# Patient Record
Sex: FEMALE | Race: ASIAN | NOT HISPANIC OR LATINO | URBAN - METROPOLITAN AREA
[De-identification: names, ages, dates, MRNs, and addresses within clinical notes are randomized per-mention and may not be internally consistent; named-entity substitution may affect disease eponyms.]

---

## 2020-04-02 ENCOUNTER — EMERGENCY (EMERGENCY)
Facility: HOSPITAL | Age: 70
LOS: 1 days | Discharge: HOME | End: 2020-04-02
Attending: EMERGENCY MEDICINE
Payer: MEDICARE

## 2020-04-02 VITALS
SYSTOLIC BLOOD PRESSURE: 154 MMHG | HEIGHT: 63 IN | TEMPERATURE: 99 F | DIASTOLIC BLOOD PRESSURE: 80 MMHG | WEIGHT: 147.05 LBS | RESPIRATION RATE: 20 BRPM | OXYGEN SATURATION: 95 % | HEART RATE: 103 BPM

## 2020-04-02 VITALS
HEART RATE: 92 BPM | OXYGEN SATURATION: 97 % | SYSTOLIC BLOOD PRESSURE: 123 MMHG | RESPIRATION RATE: 18 BRPM | TEMPERATURE: 103 F | DIASTOLIC BLOOD PRESSURE: 70 MMHG

## 2020-04-02 PROCEDURE — 99282 EMERGENCY DEPT VISIT SF MDM: CPT

## 2020-04-02 PROCEDURE — 99283 EMERGENCY DEPT VISIT LOW MDM: CPT

## 2020-04-02 RX ORDER — ACETAMINOPHEN 500 MG
650 TABLET ORAL ONCE
Refills: 0 | Status: COMPLETED | OUTPATIENT
Start: 2020-04-02 | End: 2020-04-02

## 2020-04-02 RX ADMIN — Medication 650 MILLIGRAM(S): at 22:17

## 2020-04-02 NOTE — ED PROVIDER NOTE - OBJECTIVE STATEMENT
*** PATIENT SEEN DURING COVID PANDEMIC -- [place] IN STATE OF [facility] -- HOSPITAL RESOURCES ARE CRITICALLY OVERWHELMED -- NORMAL DECISION MAKING PROCESS IS SIGNIFICANTLY ALTERED -- RISK/BENEFIT ANALYSIS FAVORS DISCHARGE/OUTPATIENT MANAGEMENT IN MANY CASES THAT WOULD NORMALLY BE ADMITTED ***     70 yo female denies PMH p/w cough, weakness x 9d, worse over the last 2d, decreased appetite 2/2 loss of taste.  denies SOB, no syncope, no CP.

## 2020-04-02 NOTE — ED ADULT NURSE NOTE - OBJECTIVE STATEMENT
69y Female A&OX3 came to ED c/o weakness. No PMH or PSH. Pt states since last Friday, she has been feeling sick with dry cough, headache, fever. Went to ED and was told Chest X-ray was clear, no COVID test done, Pt was discharged.  Pt states she started to continue to have weakness, fever controlled with Tylenol, and taste and smell started to diminish, Pt started to have decreased appetite.  Pt was worried and and came to ED. Pt presents with generalized weakness, walking SPO2 at 96% on room air.  Denies sob, chest pain, ha, n/v/d, abdominal pain, urinary symptoms, hematuria. Upon examination, full facial symmetry, no JVD or trach deviation, lung sounds clear and adequate chest rise, S1 and S2 heart sounds present, +2 pulses in all extremities with full ROM and equal strength, cap refill <2 seconds, ABD soft, non distended and non tender, pelvis intact

## 2020-04-02 NOTE — ED PROVIDER NOTE - CLINICAL SUMMARY MEDICAL DECISION MAKING FREE TEXT BOX
ambulatory O2 sat 95% on room air with no respiratory distress.  patient is likely to be more harmed by admission at this time than benefit, particularly given hospital resources are currently overwhelmed by COVID pandemic.  return for worsening SOB.

## 2020-04-02 NOTE — ED PROVIDER NOTE - PATIENT PORTAL LINK FT
You can access the FollowMyHealth Patient Portal offered by [facility] by registering at the following website: [email or web address]. By joining [employer] [facility]’s FollowMyHealth portal, you will also be able to view your health information using other applications (apps) compatible with our system.

## 2020-04-02 NOTE — ED PROVIDER NOTE - PHYSICAL EXAMINATION
GEN: calm, cooperative  EYES: EOM intact  ENT: mucous membranes moist  HEAD: NCAT  CV: S1 S2   RESP: no respiratory distress  ABD: no abdominal distension  MSK: moves all extremities  NEURO: awake, alert, oriented  PSYCH: affect normal  SKIN: warm

## 2020-04-02 NOTE — ED ADULT NURSE NOTE - NSIMPLEMENTINTERV_GEN_ALL_ED
Implemented All Universal Safety Interventions:  [place] to call system. Call bell, personal items and telephone within reach. Instruct patient to call for assistance. Room bathroom lighting operational. Non-slip footwear when patient is off stretcher. Physically safe environment: no spills, clutter or unnecessary equipment. Stretcher in lowest position, wheels locked, appropriate side rails in place.

## 2020-04-02 NOTE — ED ADULT NURSE REASSESSMENT NOTE - NS ED NURSE REASSESS COMMENT FT1
Pt had 103 F fever, Pt given 650mg Tylenol.  As per MD, Pt can be discharged after giving Tylenol.  Pt informed about COVID 19,  Pt understands quarantine precautions, Pt discharged with steady gait to waiting room.

## 2020-04-02 NOTE — ED PROVIDER NOTE - NSFOLLOWUPINSTRUCTIONS_ED_ALL_ED_FT
YOUR SYMPTOMS ARE CONSISTENT WITH CORONAVIRUS INFECTION.    ISOLATE YOURSELF FOR 14 DAYS FROM THE START OF YOUR SYMPTOMS.  MAKE SURE YOU HAVE NO FEVER FOR AT LEAST 72 HOURS BEFORE YOU END YOUR ISOLATION.    RETURN TO THE ED FOR TROUBLE BREATHING OR AN INABILITY TO WALK.

## 2020-04-02 NOTE — ED ADULT TRIAGE NOTE - CHIEF COMPLAINT QUOTE
pt states, "I have been feeling sick since last week but I haven't been eating because nothing tastes good"

## 2020-04-03 ENCOUNTER — EMERGENCY (EMERGENCY)
Facility: HOSPITAL | Age: 70
LOS: 1 days | Discharge: HOME | End: 2020-04-03
Attending: EMERGENCY MEDICINE
Payer: MEDICARE

## 2020-04-03 VITALS
SYSTOLIC BLOOD PRESSURE: 116 MMHG | OXYGEN SATURATION: 93 % | HEART RATE: 98 BPM | DIASTOLIC BLOOD PRESSURE: 55 MMHG | RESPIRATION RATE: 20 BRPM | HEIGHT: 63 IN | WEIGHT: 147.05 LBS | TEMPERATURE: 101 F

## 2020-04-03 VITALS
RESPIRATION RATE: 16 BRPM | DIASTOLIC BLOOD PRESSURE: 75 MMHG | SYSTOLIC BLOOD PRESSURE: 130 MMHG | TEMPERATURE: 99 F | HEART RATE: 82 BPM | OXYGEN SATURATION: 95 %

## 2020-04-03 LAB
ALBUMIN SERPL ELPH-MCNC: 3.9 G/DL — SIGNIFICANT CHANGE UP (ref 3.3–5)
ALP SERPL-CCNC: 67 U/L — SIGNIFICANT CHANGE UP (ref 40–120)
ALT FLD-CCNC: 18 U/L — SIGNIFICANT CHANGE UP (ref 10–45)
ANION GAP SERPL CALC-SCNC: 16 MMOL/L — SIGNIFICANT CHANGE UP (ref 5–17)
APPEARANCE UR: ABNORMAL
APTT BLD: 32.3 SEC — SIGNIFICANT CHANGE UP (ref 27.5–36.3)
AST SERPL-CCNC: 26 U/L — SIGNIFICANT CHANGE UP (ref 10–40)
BASOPHILS # BLD AUTO: 0.01 K/UL — SIGNIFICANT CHANGE UP (ref 0–0.2)
BASOPHILS NFR BLD AUTO: 0.2 % — SIGNIFICANT CHANGE UP (ref 0–2)
BILIRUB SERPL-MCNC: 0.3 MG/DL — SIGNIFICANT CHANGE UP (ref 0.2–1.2)
BILIRUB UR-MCNC: NEGATIVE — SIGNIFICANT CHANGE UP
BUN SERPL-MCNC: 13 MG/DL — SIGNIFICANT CHANGE UP (ref 7–23)
CALCIUM SERPL-MCNC: 9.1 MG/DL — SIGNIFICANT CHANGE UP (ref 8.4–10.5)
CHLORIDE SERPL-SCNC: 96 MMOL/L — SIGNIFICANT CHANGE UP (ref 96–108)
CO2 SERPL-SCNC: 19 MMOL/L — LOW (ref 22–31)
COLOR SPEC: YELLOW — SIGNIFICANT CHANGE UP
CREAT SERPL-MCNC: 0.7 MG/DL — SIGNIFICANT CHANGE UP (ref 0.5–1.3)
DIFF PNL FLD: ABNORMAL
EOSINOPHIL # BLD AUTO: 0 K/UL — SIGNIFICANT CHANGE UP (ref 0–0.5)
EOSINOPHIL NFR BLD AUTO: 0 % — SIGNIFICANT CHANGE UP (ref 0–6)
GLUCOSE SERPL-MCNC: 107 MG/DL — HIGH (ref 70–99)
GLUCOSE UR QL: NEGATIVE — SIGNIFICANT CHANGE UP
HCT VFR BLD CALC: 39.9 % — SIGNIFICANT CHANGE UP (ref 34.5–45)
HGB BLD-MCNC: 12.9 G/DL — SIGNIFICANT CHANGE UP (ref 11.5–15.5)
IMM GRANULOCYTES NFR BLD AUTO: 0.4 % — SIGNIFICANT CHANGE UP (ref 0–1.5)
INR BLD: 1.19 RATIO — HIGH (ref 0.88–1.16)
KETONES UR-MCNC: NEGATIVE — SIGNIFICANT CHANGE UP
LEUKOCYTE ESTERASE UR-ACNC: ABNORMAL
LYMPHOCYTES # BLD AUTO: 0.87 K/UL — LOW (ref 1–3.3)
LYMPHOCYTES # BLD AUTO: 15.6 % — SIGNIFICANT CHANGE UP (ref 13–44)
MAGNESIUM SERPL-MCNC: 2.2 MG/DL — SIGNIFICANT CHANGE UP (ref 1.6–2.6)
MCHC RBC-ENTMCNC: 25.5 PG — LOW (ref 27–34)
MCHC RBC-ENTMCNC: 32.3 GM/DL — SIGNIFICANT CHANGE UP (ref 32–36)
MCV RBC AUTO: 78.9 FL — LOW (ref 80–100)
MONOCYTES # BLD AUTO: 0.3 K/UL — SIGNIFICANT CHANGE UP (ref 0–0.9)
MONOCYTES NFR BLD AUTO: 5.4 % — SIGNIFICANT CHANGE UP (ref 2–14)
NEUTROPHILS # BLD AUTO: 4.36 K/UL — SIGNIFICANT CHANGE UP (ref 1.8–7.4)
NEUTROPHILS NFR BLD AUTO: 78.4 % — HIGH (ref 43–77)
NITRITE UR-MCNC: POSITIVE
NRBC # BLD: 0 /100 WBCS — SIGNIFICANT CHANGE UP (ref 0–0)
PH UR: 6 — SIGNIFICANT CHANGE UP (ref 5–8)
PHOSPHATE SERPL-MCNC: 3.1 MG/DL — SIGNIFICANT CHANGE UP (ref 2.5–4.5)
PLATELET # BLD AUTO: 313 K/UL — SIGNIFICANT CHANGE UP (ref 150–400)
POTASSIUM SERPL-MCNC: 4.3 MMOL/L — SIGNIFICANT CHANGE UP (ref 3.5–5.3)
POTASSIUM SERPL-SCNC: 4.3 MMOL/L — SIGNIFICANT CHANGE UP (ref 3.5–5.3)
PROT SERPL-MCNC: 7.5 G/DL — SIGNIFICANT CHANGE UP (ref 6–8.3)
PROT UR-MCNC: ABNORMAL
PROTHROM AB SERPL-ACNC: 13.6 SEC — HIGH (ref 10–12.9)
RBC # BLD: 5.06 M/UL — SIGNIFICANT CHANGE UP (ref 3.8–5.2)
RBC # FLD: 13.9 % — SIGNIFICANT CHANGE UP (ref 10.3–14.5)
SODIUM SERPL-SCNC: 131 MMOL/L — LOW (ref 135–145)
SP GR SPEC: 1.02 — SIGNIFICANT CHANGE UP (ref 1.01–1.02)
UROBILINOGEN FLD QL: NEGATIVE — SIGNIFICANT CHANGE UP
WBC # BLD: 5.56 K/UL — SIGNIFICANT CHANGE UP (ref 3.8–10.5)
WBC # FLD AUTO: 5.56 K/UL — SIGNIFICANT CHANGE UP (ref 3.8–10.5)

## 2020-04-03 PROCEDURE — 99285 EMERGENCY DEPT VISIT HI MDM: CPT

## 2020-04-03 PROCEDURE — 85610 PROTHROMBIN TIME: CPT

## 2020-04-03 PROCEDURE — 85730 THROMBOPLASTIN TIME PARTIAL: CPT

## 2020-04-03 PROCEDURE — 81001 URINALYSIS AUTO W/SCOPE: CPT

## 2020-04-03 PROCEDURE — 71045 X-RAY EXAM CHEST 1 VIEW: CPT

## 2020-04-03 PROCEDURE — 84100 ASSAY OF PHOSPHORUS: CPT

## 2020-04-03 PROCEDURE — 93005 ELECTROCARDIOGRAM TRACING: CPT

## 2020-04-03 PROCEDURE — 87186 SC STD MICRODIL/AGAR DIL: CPT

## 2020-04-03 PROCEDURE — 85027 COMPLETE CBC AUTOMATED: CPT

## 2020-04-03 PROCEDURE — 99284 EMERGENCY DEPT VISIT MOD MDM: CPT | Mod: 25

## 2020-04-03 PROCEDURE — 87086 URINE CULTURE/COLONY COUNT: CPT

## 2020-04-03 PROCEDURE — 83735 ASSAY OF MAGNESIUM: CPT

## 2020-04-03 PROCEDURE — 83690 ASSAY OF LIPASE: CPT

## 2020-04-03 PROCEDURE — 96374 THER/PROPH/DIAG INJ IV PUSH: CPT

## 2020-04-03 PROCEDURE — 93010 ELECTROCARDIOGRAM REPORT: CPT

## 2020-04-03 PROCEDURE — 71045 X-RAY EXAM CHEST 1 VIEW: CPT | Mod: 26

## 2020-04-03 PROCEDURE — 84484 ASSAY OF TROPONIN QUANT: CPT

## 2020-04-03 PROCEDURE — 80053 COMPREHEN METABOLIC PANEL: CPT

## 2020-04-03 RX ORDER — ACETAMINOPHEN 500 MG
975 TABLET ORAL ONCE
Refills: 0 | Status: COMPLETED | OUTPATIENT
Start: 2020-04-03 | End: 2020-04-03

## 2020-04-03 RX ORDER — SODIUM CHLORIDE 9 MG/ML
500 INJECTION INTRAMUSCULAR; INTRAVENOUS; SUBCUTANEOUS ONCE
Refills: 0 | Status: COMPLETED | OUTPATIENT
Start: 2020-04-03 | End: 2020-04-03

## 2020-04-03 RX ORDER — CEPHALEXIN 500 MG
1 CAPSULE ORAL
Qty: 14 | Refills: 0
Start: 2020-04-03 | End: 2020-04-09

## 2020-04-03 RX ORDER — CEFTRIAXONE 500 MG/1
1000 INJECTION, POWDER, FOR SOLUTION INTRAMUSCULAR; INTRAVENOUS ONCE
Refills: 0 | Status: COMPLETED | OUTPATIENT
Start: 2020-04-03 | End: 2020-04-03

## 2020-04-03 RX ADMIN — SODIUM CHLORIDE 500 MILLILITER(S): 9 INJECTION INTRAMUSCULAR; INTRAVENOUS; SUBCUTANEOUS at 14:42

## 2020-04-03 RX ADMIN — CEFTRIAXONE 100 MILLIGRAM(S): 500 INJECTION, POWDER, FOR SOLUTION INTRAMUSCULAR; INTRAVENOUS at 14:42

## 2020-04-03 RX ADMIN — Medication 975 MILLIGRAM(S): at 12:22

## 2020-04-03 NOTE — ED PROVIDER NOTE - PHYSICAL EXAMINATION
A&Ox3, NAD. NCAT. PERRL, EOMI. Neck supple, no LAD. Lungs CTAB, breathing comfortably, speaking in sentences. +S1S2, RRR, No m/r/g. Abd soft, NT/ND, +BS, no rebound or guarding. Extremities: cap refill <2, pulses in distal extremities 4+, no edema. Skin without rash. CN II-XII intact. Strength 5/5 UE/LE. Sensations intact throughout. Gait steady. A&Ox3, NAD. NCAT. PERRL, EOMI. Neck supple, no LAD. Lungs CTAB, breathing comfortably, speaking in sentences. +S1S2, RRR, No m/r/g. Abd soft, NT/ND, +BS, no rebound or guarding. Extremities: cap refill <2, pulses in distal extremities 4+, no edema. Skin without rash. CN II-XII intact. Strength 5/5 UE/LE. Sensations intact throughout. Gait steady.  Attending Cara Palmer: Gen: NAD, heent: atrauamtic, eomi, perrla, mmm, op pink, uvula midline, neck; nttp, no nuchal rigidity, chest: nttp, no crepitus, cv: rrr, no murmurs, lungs: ctab, abd: soft, nontender, nondistended, no peritoneal signs, +BS, no guarding, ext: wwp, neg homans, skin: no rash, neuro: awake and alert, following commands, speech clear, sensation and strength intact, no focal deficits , ambulating without respiratoyr distress

## 2020-04-03 NOTE — ED PROVIDER NOTE - CARE PLAN
Principal Discharge DX:	Suspected 2019 novel coronavirus infection  Secondary Diagnosis:	Decreased appetite Principal Discharge DX:	Suspected 2019 novel coronavirus infection  Secondary Diagnosis:	Decreased appetite  Secondary Diagnosis:	Infective urethritis

## 2020-04-03 NOTE — ED PROVIDER NOTE - PROGRESS NOTE DETAILS
pts ambulatory saturations 91-92% on RA, breathing comfortably, speaking in full sentences. -EVELIO MillerC Pt tolerating PO. -Katty Thornton PA-C Attending Cara Palmer: pt well appaering. UA shows signs of infection will cover with abx pending culture. pt ambulating without hypoxia will d/c with close return precautions

## 2020-04-03 NOTE — ED PROVIDER NOTE - ATTENDING CONTRIBUTION TO CARE
Attending MD Cara Palmer:   I personally have seen and examined this patient.  Physician assistant note reviewed and agree on plan of care and except where noted.  See HPI, PE, and MDM for details.

## 2020-04-03 NOTE — ED ADULT NURSE NOTE - OBJECTIVE STATEMENT
Patient was seen here yesterday for the same.  Complaining of lack of appetite and unable to eat.  Denies nausea and vomiting.  No diarrhea.  Patient was febrile in the room and talking in full sentences. Able to ambulate with no distress

## 2020-04-03 NOTE — ED PROVIDER NOTE - OBJECTIVE STATEMENT
70 yo female with no pmh presenting with concern of weakness, fevers, chills, dry cough x 11 days, increased weakness and decreased appetite for the past 4 days here d/t concern of loss of appetite and increased weakness since previous ED visit yesterday. pt states she has lost her taste, has been attempting to stay hydrated with Gatorade but feels her efforts are not enough to keep her hydrated. notes some nausea but no vomiting, no diarrhea or constipation, no shortness of breath or difficulty breathing.

## 2020-04-03 NOTE — ED PROVIDER NOTE - NSFOLLOWUPINSTRUCTIONS_ED_ALL_ED_FT
1) Call your primary care provider to discuss this visit.    2) Continue to take all medications as prescribed. Read all discharge instructions.    3) Rest and drink plenty of fluids. Pain and fever can be managed with Acetaminophen (aka Tylenol) and Ibuprofen (aka Motrin or Advil) over the counter as directed.    4) Return to the ER for any new or worsening symptoms especially shortness of breath, worsening, cough, fever, chills, chest pain, or if any other concerns..     We recommend the below precautionary steps from now until 14 days from when you returned from your travel or date of your last known possible contact:  - Do not go to work, school, or public areas. Avoid using public transportation, ride-sharing, or taxis.  -Wear a mask whenever you are around other people.  -Avoid sharing personal household items. You should not share dishes, drinking glasses, cups, eating utensils, towels, or bedding with other people or pets in your home. After using these items, they should be washed thoroughly with soap and water.   - Avoid touching your eyes, nose, and mouth with your hands.  - Wash your hands often with soap and water for at least 15 to 20 seconds or clean your hands with an alcohol-based hand  that contains 60 to 95% alcohol, covering all surfaces of your hands and rubbing them together until they feel dry. Soap and water should be used preferentially if hands are visibly dirty. 1) Call your primary care provider to discuss this visit.    2) Continue to take all medications as prescribed. Read all discharge instructions.       ---Take KEFLEX (CEPHALEXIN) as prescribed for Urinary Tract Infection do not skip doses or stop prescription early.     3) Rest and drink plenty of fluids. Pain and fever can be managed with Acetaminophen (aka Tylenol) and Ibuprofen (aka Motrin or Advil) over the counter as directed.    4) Return to the ER for any new or worsening symptoms especially shortness of breath, worsening, cough, fever, chills, chest pain, or if any other concerns..     We recommend the below precautionary steps from now until 14 days from when you returned from your travel or date of your last known possible contact:  - Do not go to work, school, or public areas. Avoid using public transportation, ride-sharing, or taxis.  -Wear a mask whenever you are around other people.  -Avoid sharing personal household items. You should not share dishes, drinking glasses, cups, eating utensils, towels, or bedding with other people or pets in your home. After using these items, they should be washed thoroughly with soap and water.   - Avoid touching your eyes, nose, and mouth with your hands.  - Wash your hands often with soap and water for at least 15 to 20 seconds or clean your hands with an alcohol-based hand  that contains 60 to 95% alcohol, covering all surfaces of your hands and rubbing them together until they feel dry. Soap and water should be used preferentially if hands are visibly dirty.

## 2020-04-03 NOTE — ED PROVIDER NOTE - CLINICAL SUMMARY MEDICAL DECISION MAKING FREE TEXT BOX
Attending Cara Palmer: 70 y/o female presenting with weakness and decreased po. pt with loss of appetite. upon arrival pt well appearing without respiratory distress. given small volume of fluid as pt states not eating and drinking as much. abdomen soft on exam. UA sent and positive. pt denies any h/o flank pain or h/o ureteral stones. will cover with abx pending culture. pt ambulating in the ED without sig hypoxia. suspect likely infection with covid as well which is likely responsible for symptoms. pt givenclose return precautions

## 2020-04-03 NOTE — ED PROVIDER NOTE - NS ED ROS FT
Constitutional: see hpi  Eyes: No visual changes, eye pain or redness  HEENT: No throat pain, ear pain, nasal pain. No nose bleeding.  CV: No chest pain or lower extremity edema  Resp: No SOB no cough  GI: see hpi  : No dysuria, hematuria.   MSK: No musculoskeletal pain  Skin: No rash  Neuro: No headache. No numbness or tingling. No weakness.

## 2020-04-03 NOTE — ED PROVIDER NOTE - PATIENT PORTAL LINK FT
You can access the FollowMyHealth Patient Portal offered by [facility] by registering at the following website: [email or web address]. By joining [facility]’s FollowMyHealth portal, you will also be able to view your health information using other applications (apps) compatible with our system.

## 2020-04-05 NOTE — ED POST DISCHARGE NOTE - ADDITIONAL DOCUMENTATION
4/6/2020: UCx Ecoli discharged on keflex and UCx sensitive. No further action  -Victorina Abdul PA-C